# Patient Record
Sex: FEMALE | Race: WHITE | NOT HISPANIC OR LATINO | URBAN - METROPOLITAN AREA
[De-identification: names, ages, dates, MRNs, and addresses within clinical notes are randomized per-mention and may not be internally consistent; named-entity substitution may affect disease eponyms.]

---

## 2024-01-22 ENCOUNTER — OFFICE VISIT (OUTPATIENT)
Dept: OTOLARYNGOLOGY | Facility: CLINIC | Age: 79
End: 2024-01-22
Payer: MEDICARE

## 2024-01-22 VITALS — BODY MASS INDEX: 24.17 KG/M2 | WEIGHT: 128 LBS | HEIGHT: 61 IN | TEMPERATURE: 96.1 F

## 2024-01-22 DIAGNOSIS — J33.9 NASAL POLYPS: Primary | ICD-10-CM

## 2024-01-22 DIAGNOSIS — J32.2 CHRONIC ETHMOIDAL SINUSITIS: ICD-10-CM

## 2024-01-22 PROCEDURE — 99213 OFFICE O/P EST LOW 20 MIN: CPT | Performed by: OTOLARYNGOLOGY

## 2024-01-22 PROCEDURE — 1036F TOBACCO NON-USER: CPT | Performed by: OTOLARYNGOLOGY

## 2024-01-22 PROCEDURE — 1126F AMNT PAIN NOTED NONE PRSNT: CPT | Performed by: OTOLARYNGOLOGY

## 2024-01-22 PROCEDURE — 1159F MED LIST DOCD IN RCRD: CPT | Performed by: OTOLARYNGOLOGY

## 2024-01-22 PROCEDURE — 31231 NASAL ENDOSCOPY DX: CPT | Performed by: OTOLARYNGOLOGY

## 2024-01-22 RX ORDER — MULTIVIT-MIN/FA/LYCOPEN/LUTEIN .4-300-25
1 TABLET ORAL
COMMUNITY

## 2024-01-22 RX ORDER — ALENDRONATE SODIUM 35 MG/1
35 TABLET ORAL
COMMUNITY
Start: 2023-11-14

## 2024-01-22 RX ORDER — FLUTICASONE PROPIONATE 50 MCG
1 SPRAY, SUSPENSION (ML) NASAL 2 TIMES DAILY
Qty: 48 G | Refills: 3 | Status: SHIPPED | OUTPATIENT
Start: 2024-01-22 | End: 2024-04-21

## 2024-01-22 RX ORDER — FERROUS GLUCONATE 256(28)MG
28 TABLET ORAL
COMMUNITY
Start: 2015-08-12

## 2024-01-22 RX ORDER — ASCORBIC ACID 300 MG
TABLET,CHEWABLE ORAL
COMMUNITY

## 2024-01-22 RX ORDER — FLUTICASONE PROPIONATE 50 MCG
SPRAY, SUSPENSION (ML) NASAL
COMMUNITY
Start: 2024-01-22 | End: 2024-01-22 | Stop reason: ALTCHOICE

## 2024-01-22 RX ORDER — AMLODIPINE BESYLATE 5 MG/1
5 TABLET ORAL DAILY
COMMUNITY
Start: 2017-04-10

## 2024-01-22 ASSESSMENT — PATIENT HEALTH QUESTIONNAIRE - PHQ9
10. IF YOU CHECKED OFF ANY PROBLEMS, HOW DIFFICULT HAVE THESE PROBLEMS MADE IT FOR YOU TO DO YOUR WORK, TAKE CARE OF THINGS AT HOME, OR GET ALONG WITH OTHER PEOPLE: NOT DIFFICULT AT ALL
SUM OF ALL RESPONSES TO PHQ9 QUESTIONS 1 AND 2: 2
1. LITTLE INTEREST OR PLEASURE IN DOING THINGS: SEVERAL DAYS
2. FEELING DOWN, DEPRESSED OR HOPELESS: SEVERAL DAYS

## 2024-01-22 NOTE — PROGRESS NOTES
Chief Complaint:  1. Chronic sinusitis with nasal polyposis    History Of Present Illness:    Main Symptoms:  Patient does not have anterior nasal drainage.     Patient does not have  posterior nasal drainage.    Patient does not have nasal airway obstruction.   Patient does not have  facial pain.    Patient does not have  facial pressure.    Patient does not have decreased sense of smell.   Associated Symptoms:   Patient does not have  headaches.    Patient does not have throat clearing.    Patient does not have coughing.    Patient does not have dysphonia.   Patient does not have nasal bleeding.     Medications currently on for sinonasal symptoms:     Saline rinses BID  Flonase 1 puff each side BID     Missy Nava presents since last being seen 5/23/22.  In the interval time, she has continued to do well.  She is using saline rinses twice per day and fluticasone 1 puff each nostril twice per day.     Active Problems:  There is no problem list on file for this patient.     Past Medical History:  She has no past medical history on file.    Surgical History:  She has a past surgical history that includes Tubal ligation (06/10/2014) and Colonoscopy (07/23/2015).     Family History:  No family history on file.    Social History:  She has no history on file for tobacco use, alcohol use, and drug use.     Allergies:  Patient has no allergy information on record.    Current Meds:  No current outpatient medications on file.    Vitals:  There were no vitals taken for this visit.     Physical Exam:  Nose: On external exam there are neither lesions nor asymmetry of the nasal tip/dorsum. On anterior rhinoscopy, visualization posteriorly is limited on anterior examination. For this reason, to adequately evaluate posteriorly for masses, source of epistaxis, polypoid disease, debridement, and/or signs of infections, nasal endoscopy is indicated.  (Please see procedure below.)    SINONASAL ENDOSCOPY (CPT 26127): To better  evaluate the patient's symptoms, sinonasal endoscopy is indicated.  After discussion of risks and benefits, and topical decongestion and anesthesia,an endoscope was used to perform nasal endoscopy on each side.  A time out identifying the patient, the procedure, the location of the procedure and any concerns was performed prior to beginning the procedure.    Findings: Examination of each nasal cavity revealed a small amount of polypoid tissue within each middle meatus and sphenoethmoid recess.  There was slightly more polypoid tissue on the left than the right.  On each side the polypoid tissue did not extend past the superior aspect of the inferior turbinate or to the superior aspect of the posterior choana.    Provider Impressions:  1. Chronic sinusitis with nasal polyposis    Discussion: Missy Nava appears very well on examination today.  She asked for a 90-day refill for fluticasone and this was prescribed.  Given her clinical exam and minimal symptoms I recommend follow-up in 12 months.  She was amenable to this and all questions were answered.    Patient Discussion/Summary:  Please followup with me in 12 months for reevaluation or sooner with any questions or concerns. Please feel free to contact my office by calling [phone] with any questions.    Signature:  Scribe Attestation  By signing my name below, ILeilani Scribe   attest that this documentation has been prepared under the direction and in the presence of Valdemar Patricia MD.

## 2024-02-12 ENCOUNTER — APPOINTMENT (OUTPATIENT)
Dept: OTOLARYNGOLOGY | Facility: CLINIC | Age: 79
End: 2024-02-12
Payer: MEDICARE

## 2024-12-31 DIAGNOSIS — J33.9 NASAL POLYPS: ICD-10-CM

## 2025-01-02 RX ORDER — FLUTICASONE PROPIONATE 50 MCG
1 SPRAY, SUSPENSION (ML) NASAL 2 TIMES DAILY
Qty: 48 G | Refills: 3 | Status: SHIPPED | OUTPATIENT
Start: 2025-01-02 | End: 2025-04-02

## 2025-01-07 ENCOUNTER — APPOINTMENT (OUTPATIENT)
Dept: OTOLARYNGOLOGY | Facility: CLINIC | Age: 80
End: 2025-01-07
Payer: MEDICARE

## 2025-04-08 DIAGNOSIS — J33.9 NASAL POLYP: ICD-10-CM

## 2025-06-06 RX ORDER — FLUTICASONE PROPIONATE 50 MCG
SPRAY, SUSPENSION (ML) NASAL
Qty: 48 G | Refills: 1 | Status: SHIPPED | OUTPATIENT
Start: 2025-06-06

## 2025-07-15 ENCOUNTER — APPOINTMENT (OUTPATIENT)
Dept: OTOLARYNGOLOGY | Facility: CLINIC | Age: 80
End: 2025-07-15
Payer: MEDICARE

## 2025-07-15 VITALS — BODY MASS INDEX: 24.17 KG/M2 | HEIGHT: 61 IN | WEIGHT: 128 LBS

## 2025-07-15 DIAGNOSIS — J33.9 NASAL POLYP: Primary | ICD-10-CM

## 2025-07-15 DIAGNOSIS — J32.2 CHRONIC ETHMOIDAL SINUSITIS: ICD-10-CM

## 2025-07-15 PROCEDURE — 99213 OFFICE O/P EST LOW 20 MIN: CPT | Performed by: OTOLARYNGOLOGY

## 2025-07-15 PROCEDURE — 31231 NASAL ENDOSCOPY DX: CPT | Performed by: OTOLARYNGOLOGY

## 2025-07-15 PROCEDURE — 1159F MED LIST DOCD IN RCRD: CPT | Performed by: OTOLARYNGOLOGY

## 2025-07-15 PROCEDURE — 1160F RVW MEDS BY RX/DR IN RCRD: CPT | Performed by: OTOLARYNGOLOGY

## 2025-07-15 RX ORDER — FLUTICASONE PROPIONATE 50 MCG
1 SPRAY, SUSPENSION (ML) NASAL 2 TIMES DAILY
Qty: 48 G | Refills: 3 | Status: SHIPPED | OUTPATIENT
Start: 2025-07-15 | End: 2026-07-15

## 2025-07-15 ASSESSMENT — PATIENT HEALTH QUESTIONNAIRE - PHQ9
1. LITTLE INTEREST OR PLEASURE IN DOING THINGS: NOT AT ALL
SUM OF ALL RESPONSES TO PHQ9 QUESTIONS 1 AND 2: 0
2. FEELING DOWN, DEPRESSED OR HOPELESS: NOT AT ALL

## 2025-07-15 NOTE — PROGRESS NOTES
"          S[facility]    Chief Complaint:  1. Chronic sinusitis with nasal polyposis    History Of Present Illness:  Missy Nava presents since last being seen 1/22/2024.  She has been doing well in regard to her sinonasal symptoms and is using fluticasone twice per day and saline rinses twice per day.    Main Symptoms:  Patient does not have anterior nasal drainage.  Patient does not have posterior nasal drainage.  Patient does not have nasal airway obstruction.  Patient does not have facial pain.  Patient does not have facial pressure.  Patient does not have decreased sense of smell.   Associated Symptoms:  Patient does not have headaches.  Patient does not have throat clearing.  Patient does not have coughing.  Patient does not have dysphonia.  Patient does not have nasal bleeding.    Medications currently on for sinonasal symptoms:  Fluticasone 1 puff each side BID  Saline rinse BID    Active Problems:  Problem List[1]    Past Medical History:  She has no past medical history on file.    Surgical History:  She has a past surgical history that includes Tubal ligation (06/10/2014) and Colonoscopy (07/23/2015).    Family History:  Family History[2]    Social History:  She reports that she has never smoked. She has never used smokeless tobacco. No history on file for alcohol use and drug use.    Allergies:  Cat dander and Dog dander    Current Meds:  Current Medications[3]    Vitals:  Visit Vitals  Ht (!) 1.549 m (5' 1\")   Wt 58.1 kg (128 lb)   BMI 24.19 kg/m²   Smoking Status Never   BSA 1.58 m²     Physical Exam:  Nose: On external exam there are neither lesions nor asymmetry of the nasal tip/dorsum. On anterior rhinoscopy, visualization posteriorly is limited on anterior examination. For this reason, to adequately evaluate posteriorly for masses, source of epistaxis, polypoid disease, debridement, and/or signs of infections, nasal endoscopy is indicated.  (Please see procedure " below.)    SINONASAL ENDOSCOPY (CPT 53831): To better evaluate the patient's symptoms, sinonasal endoscopy is indicated.  After discussion of risks and benefits, and topical decongestion and anesthesia, an endoscope was used to perform nasal endoscopy on each side.  A time out identifying the patient, the procedure, the location of the procedure and any concerns was performed prior to beginning the procedure.    Findings:  Examination of the right nasal cavity revealed a small amount of polypoid tissue both of the middle meatus and sphenoethmoid recess without mucopurulence.  Examination of the left nasal cavity again revealed a small amount of polypoid tissue at the middle meatus and sphenoethmoid recess without mucopurulence.    Provider Impressions:  1. Chronic sinusitis with nasal polyposis    Discussion:  Missy Nava has done well with consistent utilization of fluticasone.  A prescription was provided today.  I asked her to follow-up with me in 12 months.  All questions were answered.    Scribe and Provider Attestation  By signing my name below, IEliot, Scribe, attest that this documentation has been prepared under the direction and in the presence of Valdemar Patricia MD. All medical record entries made by the scribe were at the direction of Valdemar Patricia MD or personally dictated by Valdemar Patricia MD. I, Valdemar Patricia MD, have reviewed the chart and agree that the record accurately reflects my personal performance of the history, physical exam, discussion and plan.    Signature:  Valdemar Patricia MD       [1] There is no problem list on file for this patient.   [2] No family history on file.  [3]   Current Outpatient Medications:     alendronate (Fosamax) 35 mg tablet, Take 1 tablet (35 mg) by mouth., Disp: , Rfl:     amLODIPine (Norvasc) 5 mg tablet, Take 1 tablet (5 mg) by mouth once daily., Disp: , Rfl:     dextrose 5 % in water (D5W) parenteral solution 1,000 mL  with calcium gluconate 100 mg/mL (10%) solution 10,000 mg, Take by mouth., Disp: , Rfl:     ferrous gluconate 256 mg (28 mg iron) tablet, Take 1 tablet (28 mg) by mouth., Disp: , Rfl:     fluticasone (Flonase) 50 mcg/actuation nasal spray, ADMINISTER 2 SPRAYS INTO EACH NOSTRIL ONCE A DAY, Disp: 48 g, Rfl: 1    glucos sul [id number] (Glucosamine Chondroitin) 550-30-1 mg capsule, , Disp: , Rfl:     multivitamin with minerals iron-free (Centrum Silver), Take 1 tablet by mouth., Disp: , Rfl: